# Patient Record
Sex: MALE | Race: WHITE | NOT HISPANIC OR LATINO | URBAN - METROPOLITAN AREA
[De-identification: names, ages, dates, MRNs, and addresses within clinical notes are randomized per-mention and may not be internally consistent; named-entity substitution may affect disease eponyms.]

---

## 2022-07-13 ENCOUNTER — NURSE TRIAGE (OUTPATIENT)
Dept: ADMINISTRATIVE | Facility: CLINIC | Age: 32
End: 2022-07-13

## 2022-07-13 ENCOUNTER — OFFICE VISIT (OUTPATIENT)
Dept: URGENT CARE | Facility: CLINIC | Age: 32
End: 2022-07-13
Payer: COMMERCIAL

## 2022-07-13 VITALS
TEMPERATURE: 98 F | BODY MASS INDEX: 23.4 KG/M2 | HEART RATE: 98 BPM | HEIGHT: 69 IN | SYSTOLIC BLOOD PRESSURE: 131 MMHG | RESPIRATION RATE: 17 BRPM | WEIGHT: 158 LBS | OXYGEN SATURATION: 99 % | DIASTOLIC BLOOD PRESSURE: 90 MMHG

## 2022-07-13 DIAGNOSIS — H61.21 IMPACTED CERUMEN OF RIGHT EAR: Primary | ICD-10-CM

## 2022-07-13 PROCEDURE — 69209 REMOVE IMPACTED EAR WAX UNI: CPT | Mod: S$GLB,,, | Performed by: FAMILY MEDICINE

## 2022-07-13 PROCEDURE — 99213 OFFICE O/P EST LOW 20 MIN: CPT | Mod: 25,S$GLB,, | Performed by: FAMILY MEDICINE

## 2022-07-13 PROCEDURE — 99213 PR OFFICE/OUTPT VISIT, EST, LEVL III, 20-29 MIN: ICD-10-PCS | Mod: 25,S$GLB,, | Performed by: FAMILY MEDICINE

## 2022-07-13 PROCEDURE — 1159F MED LIST DOCD IN RCRD: CPT | Mod: CPTII,S$GLB,, | Performed by: FAMILY MEDICINE

## 2022-07-13 PROCEDURE — 1159F PR MEDICATION LIST DOCUMENTED IN MEDICAL RECORD: ICD-10-PCS | Mod: CPTII,S$GLB,, | Performed by: FAMILY MEDICINE

## 2022-07-13 PROCEDURE — 3075F PR MOST RECENT SYSTOLIC BLOOD PRESS GE 130-139MM HG: ICD-10-PCS | Mod: CPTII,S$GLB,, | Performed by: FAMILY MEDICINE

## 2022-07-13 PROCEDURE — 3075F SYST BP GE 130 - 139MM HG: CPT | Mod: CPTII,S$GLB,, | Performed by: FAMILY MEDICINE

## 2022-07-13 PROCEDURE — 3080F DIAST BP >= 90 MM HG: CPT | Mod: CPTII,S$GLB,, | Performed by: FAMILY MEDICINE

## 2022-07-13 PROCEDURE — 3080F PR MOST RECENT DIASTOLIC BLOOD PRESSURE >= 90 MM HG: ICD-10-PCS | Mod: CPTII,S$GLB,, | Performed by: FAMILY MEDICINE

## 2022-07-13 PROCEDURE — 69209 EAR CERUMEN REMOVAL: ICD-10-PCS | Mod: S$GLB,,, | Performed by: FAMILY MEDICINE

## 2022-07-13 PROCEDURE — 3008F BODY MASS INDEX DOCD: CPT | Mod: CPTII,S$GLB,, | Performed by: FAMILY MEDICINE

## 2022-07-13 PROCEDURE — 3008F PR BODY MASS INDEX (BMI) DOCUMENTED: ICD-10-PCS | Mod: CPTII,S$GLB,, | Performed by: FAMILY MEDICINE

## 2022-07-13 NOTE — PROGRESS NOTES
"Subjective:       Patient ID: Jefry Dubois is a 31 y.o. male.    Vitals:  height is 5' 9" (1.753 m) and weight is 71.7 kg (158 lb). His temperature is 98.4 °F (36.9 °C). His blood pressure is 131/90 (abnormal) and his pulse is 98. His respiration is 17 and oxygen saturation is 99%.     Chief Complaint: Ear Fullness    Patient presents with c.o rt ear pain since last night. Patient states he attempted to clean out his ear last night which only made his discomfort wrose. He reports mild dizziness. No recent air travel or swimming. Denies fever. He also admits to using an ear "candle" a few days ago        Otalgia   This is a new problem. The current episode started yesterday. The problem occurs constantly. The problem has been gradually worsening. There has been no fever. Associated symptoms include hearing loss. Treatments tried: cleaning. The treatment provided no relief.       HENT: Positive for ear pain and hearing loss.        Objective:      Physical Exam   HENT:   Head: Normocephalic and atraumatic.   Ears:   Right Ear: External ear normal.   Left Ear: Tympanic membrane, external ear and ear canal normal.      Comments: Cerumen occluding rt eoc  Nose: Nose normal.   Abdominal: Normal appearance.   Lymphadenopathy:     He has no cervical adenopathy.   Neurological: He is alert.   Nursing note and vitals reviewed.      Ear Cerumen Removal    Date/Time: 7/13/2022 12:45 PM  Performed by: Lisy Cosby DO  Authorized by: Lisy Cosby, DO     Consent Done?:  Yes (Verbal)    Local anesthetic:  None  Location details:  Right ear  Procedure type: irrigation    Cerumen  Removal Results:  Cerumen completely removed  Patient tolerance:  Patient tolerated the procedure well with no immediate complications      Assessment:       1. Impacted cerumen of right ear          Plan:         Impacted cerumen of right ear    Other orders  -     Ear Cerumen Removal                   "

## 2022-07-13 NOTE — TELEPHONE ENCOUNTER
Patient usually uses ear candles to get the wax out of his ears, has issues with wax build up he states it is related to sinus issues.  Last night he used an earwax cleaning kit, and immediately after putting the drops in his right ear, he felt a very uncomfortable sensation and tried to rinse it out.  He denies any pain, but refers to it as a very uncomfortable feeling that he is having a hard time tolerating, sounds like everything is muffled.  He denies any pus or cloudy discharge from his ear.  I advised he be seen in the clinic today, he is not established with a PCP locally, so I advised he go to the [facility] to be evaluated.  He verbalized understanding and will go there to be seen.  Reason for Disposition   Earache lasts > 1 hour    Additional Information   Negative: Ear pain is main symptom   Negative: Hearing loss (complete or partial) is main symptom   Negative: Earwax is the main concern   Negative: Has nasal allergies and they are acting up   Negative: Pus or cloudy discharge from ear canal    Protocols used: EAR - CONGESTION-A-OH